# Patient Record
Sex: MALE
[De-identification: names, ages, dates, MRNs, and addresses within clinical notes are randomized per-mention and may not be internally consistent; named-entity substitution may affect disease eponyms.]

---

## 2019-09-25 PROBLEM — Z00.00 ENCOUNTER FOR PREVENTIVE HEALTH EXAMINATION: Status: ACTIVE | Noted: 2019-09-25

## 2019-09-26 ENCOUNTER — APPOINTMENT (OUTPATIENT)
Dept: SURGERY | Facility: CLINIC | Age: 64
End: 2019-09-26
Payer: COMMERCIAL

## 2019-09-26 VITALS
SYSTOLIC BLOOD PRESSURE: 124 MMHG | TEMPERATURE: 98.7 F | OXYGEN SATURATION: 97 % | WEIGHT: 181 LBS | DIASTOLIC BLOOD PRESSURE: 82 MMHG | HEIGHT: 75 IN | BODY MASS INDEX: 22.5 KG/M2 | HEART RATE: 69 BPM

## 2019-09-26 DIAGNOSIS — Z87.891 PERSONAL HISTORY OF NICOTINE DEPENDENCE: ICD-10-CM

## 2019-09-26 DIAGNOSIS — M79.89 OTHER SPECIFIED SOFT TISSUE DISORDERS: ICD-10-CM

## 2019-09-26 DIAGNOSIS — Z87.438 PERSONAL HISTORY OF OTHER DISEASES OF MALE GENITAL ORGANS: ICD-10-CM

## 2019-09-26 DIAGNOSIS — Z78.9 OTHER SPECIFIED HEALTH STATUS: ICD-10-CM

## 2019-09-26 PROCEDURE — 99243 OFF/OP CNSLTJ NEW/EST LOW 30: CPT

## 2019-09-26 RX ORDER — TAMSULOSIN HYDROCHLORIDE 0.4 MG/1
0.4 CAPSULE ORAL
Refills: 0 | Status: ACTIVE | COMMUNITY

## 2019-10-04 NOTE — PLAN
[FreeTextEntry1] : Patient was told significance of findings, options, risks and benefits were explained.  Informed consent for left abdominal wall and left chest wall mass excision and potential risks, benefits and alternatives (surgical options were discussed including non-surgical options or the option of no surgery) to the planned surgery were discussed in depth.  All surgical options were discussed including non-surgical treatments. Patient wishes to proceed with surgery.  We will plan for surgery on at the next available date, pending any required insurance pre-certification or pre-approval. Patient agrees to obtain any necessary pre-operative evaluations and testing prior to surgery.\par Patient advised to seek immediate medical attention with any acute change in symptoms or with the development of any new or worsening symptoms.  Patient's questions and concerns addressed to patient's satisfaction, and patient verbalized an understanding of the information discussed.\par \par

## 2019-10-04 NOTE — ASSESSMENT
[FreeTextEntry1] : Impression: left chest wall mass \par                     left abdominal wall mass

## 2019-10-04 NOTE — PHYSICAL EXAM
[Alert] : alert [Oriented to Person] : oriented to person [Oriented to Place] : oriented to place [Oriented to Time] : oriented to time [Calm] : calm [de-identified] : The patient is alert, well-groomed, and cheerful.\par   [de-identified] :   anicteric.  Nasal mucosa pink, septum midline. Oral mucosa pink.  Tongue midline, Pharynx without exudates.\par   [de-identified] :  Neck supple. Trachea midline. Thyroid isthmus barely palpable, lobes not felt.\par   [de-identified] : left chest wall mass is mobile, Firm,  Smooth, non-Tender,   Well defined. deep. No palpable lymph nodes. \par Mass size - 3 cm x 3  cm. \par left abdominal wall mass  is -mobile, Firm,  Smooth, non-Tender,   Well defined. deep. No palpable lymph nodes. \par Mass size - 1 cm x1  cm.

## 2019-10-04 NOTE — HISTORY OF PRESENT ILLNESS
[de-identified] : This is a 64 year old patient who was referred by Dr. Isaiah Rosas with the chief complaint of having left chest wall and left abdominal wall masses. Patient reports having this condition for 2-years. Patient denies any trauma to the area.  Patient states that the marilia are  getting bigger and really bother him. \par \par